# Patient Record
Sex: FEMALE | Race: WHITE
[De-identification: names, ages, dates, MRNs, and addresses within clinical notes are randomized per-mention and may not be internally consistent; named-entity substitution may affect disease eponyms.]

---

## 2019-08-09 ENCOUNTER — HOSPITAL ENCOUNTER (EMERGENCY)
Dept: HOSPITAL 95 - ER | Age: 84
Discharge: OTHER INSTITUTION | End: 2019-08-09
Payer: MEDICARE

## 2019-08-09 VITALS — HEIGHT: 71 IN | WEIGHT: 150 LBS | BODY MASS INDEX: 21 KG/M2

## 2019-08-09 DIAGNOSIS — Z79.899: ICD-10-CM

## 2019-08-09 DIAGNOSIS — N18.6: ICD-10-CM

## 2019-08-09 DIAGNOSIS — I50.32: ICD-10-CM

## 2019-08-09 DIAGNOSIS — Z88.5: ICD-10-CM

## 2019-08-09 DIAGNOSIS — Z88.6: ICD-10-CM

## 2019-08-09 DIAGNOSIS — M96.830: ICD-10-CM

## 2019-08-09 DIAGNOSIS — T82.42XA: Primary | ICD-10-CM

## 2019-08-09 DIAGNOSIS — L76.22: ICD-10-CM

## 2019-08-09 DIAGNOSIS — Z88.8: ICD-10-CM

## 2019-08-09 DIAGNOSIS — E78.5: ICD-10-CM

## 2019-08-09 DIAGNOSIS — Z99.2: ICD-10-CM

## 2019-08-09 DIAGNOSIS — I25.10: ICD-10-CM

## 2019-08-09 DIAGNOSIS — Z79.82: ICD-10-CM

## 2019-08-09 DIAGNOSIS — I12.0: ICD-10-CM

## 2019-08-09 DIAGNOSIS — Z87.891: ICD-10-CM

## 2019-08-09 DIAGNOSIS — F17.210: ICD-10-CM

## 2019-08-09 LAB
ALBUMIN SERPL BCP-MCNC: 3.3 G/DL (ref 3.4–5)
ALBUMIN/GLOB SERPL: 0.7 {RATIO} (ref 0.8–1.8)
ALT SERPL W P-5'-P-CCNC: 14 U/L (ref 12–78)
ANION GAP SERPL CALCULATED.4IONS-SCNC: 9 MMOL/L (ref 6–16)
AST SERPL W P-5'-P-CCNC: 16 U/L (ref 12–37)
BASOPHILS # BLD AUTO: 0.1 K/MM3 (ref 0–0.23)
BASOPHILS NFR BLD AUTO: 1 % (ref 0–2)
BILIRUB SERPL-MCNC: 0.3 MG/DL (ref 0.1–1)
BUN SERPL-MCNC: 37 MG/DL (ref 8–24)
CALCIUM SERPL-MCNC: 8.3 MG/DL (ref 8.5–10.1)
CHLORIDE SERPL-SCNC: 98 MMOL/L (ref 98–108)
CO2 SERPL-SCNC: 33 MMOL/L (ref 21–32)
CREAT SERPL-MCNC: 4.87 MG/DL (ref 0.4–1)
DEPRECATED RDW RBC AUTO: 54.5 FL (ref 35.1–46.3)
EOSINOPHIL # BLD AUTO: 0.46 K/MM3 (ref 0–0.68)
EOSINOPHIL NFR BLD AUTO: 4 % (ref 0–6)
ERYTHROCYTE [DISTWIDTH] IN BLOOD BY AUTOMATED COUNT: 14.2 % (ref 11.7–14.2)
GLOBULIN SER CALC-MCNC: 4.8 G/DL (ref 2.2–4)
GLUCOSE SERPL-MCNC: 93 MG/DL (ref 70–99)
HCT VFR BLD AUTO: 37.8 % (ref 33–51)
HGB BLD-MCNC: 11.9 G/DL (ref 11.5–16)
IMM GRANULOCYTES # BLD AUTO: 0.03 K/MM3 (ref 0–0.1)
IMM GRANULOCYTES NFR BLD AUTO: 0 % (ref 0–1)
LYMPHOCYTES # BLD AUTO: 2.33 K/MM3 (ref 0.84–5.2)
LYMPHOCYTES NFR BLD AUTO: 20 % (ref 21–46)
MCHC RBC AUTO-ENTMCNC: 31.5 G/DL (ref 31.5–36.5)
MCV RBC AUTO: 104 FL (ref 80–100)
MONOCYTES # BLD AUTO: 1.08 K/MM3 (ref 0.16–1.47)
MONOCYTES NFR BLD AUTO: 9 % (ref 4–13)
NEUTROPHILS # BLD AUTO: 7.85 K/MM3 (ref 1.96–9.15)
NEUTROPHILS NFR BLD AUTO: 66 % (ref 41–73)
NRBC # BLD AUTO: 0 K/MM3 (ref 0–0.02)
NRBC BLD AUTO-RTO: 0 /100 WBC (ref 0–0.2)
PLATELET # BLD AUTO: 332 K/MM3 (ref 150–400)
POTASSIUM SERPL-SCNC: 4.3 MMOL/L (ref 3.5–5.5)
PROT SERPL-MCNC: 8.1 G/DL (ref 6.4–8.2)
SODIUM SERPL-SCNC: 140 MMOL/L (ref 136–145)

## 2019-08-09 PROCEDURE — C1750 CATH, HEMODIALYSIS,LONG-TERM: HCPCS

## 2019-08-09 PROCEDURE — A9270 NON-COVERED ITEM OR SERVICE: HCPCS

## 2019-08-09 NOTE — NUR
RIJ DRESSING REMAINED C/D/I WITH PT AMBULATING IN DAY SURGERY.DISCHARGED TO
HOME-OUT VIA WHEELCHAIR WITH RX, DISCHARGE INSTRUCTIONS AND BELONGINGS ON
HAND.

## 2019-08-09 NOTE — NUR
SMALL AMOUNT OF OOZING TO RIJ DRESSING. STERI STRIP APPLIED AND POLYMEM
DRESSING PLACED. DIRECT PRESSURE HELD FOR 5 MIN. DISCHARGE INSTRUCTIONS
REVIEWED WITH PT AND GRANDDAUGHTER.

## 2019-08-09 NOTE — NUR
INTO SDS VIA [employer]. PT A&OX3-DENIES PAIN OR NAUSEA. HISTORY AND ALLERGIES
REVIEWED.LUNGS CLEAR.SATS>90% ON 3 LITERS NASAL CANULA. NPO STATUS CONFIRMED.
RIGHT CHEST WITH GUAZE DRESSING IN PLACE-WILL REMOVE AND CLEANSE GENTLY WITH
CHLORHEXIDINE.

## 2019-08-09 NOTE — NUR
SMALL SCAB NOTED TO RIGHT CHEST WALL WHERE THE PREVIOUS VASC CATH WAS PLACED.
NO SIGNS OF INFECTION.

## 2019-12-17 ENCOUNTER — HOSPITAL ENCOUNTER (INPATIENT)
Dept: HOSPITAL 95 - ER | Age: 84
LOS: 3 days | Discharge: HOME | DRG: 280 | End: 2019-12-20
Attending: HOSPITALIST | Admitting: HOSPITALIST
Payer: MEDICARE

## 2019-12-17 VITALS — BODY MASS INDEX: 27.47 KG/M2 | HEIGHT: 65 IN | WEIGHT: 164.91 LBS

## 2019-12-17 DIAGNOSIS — Z99.2: ICD-10-CM

## 2019-12-17 DIAGNOSIS — I21.A1: ICD-10-CM

## 2019-12-17 DIAGNOSIS — Z51.5: ICD-10-CM

## 2019-12-17 DIAGNOSIS — E87.1: ICD-10-CM

## 2019-12-17 DIAGNOSIS — I50.32: ICD-10-CM

## 2019-12-17 DIAGNOSIS — E87.5: ICD-10-CM

## 2019-12-17 DIAGNOSIS — J96.21: ICD-10-CM

## 2019-12-17 DIAGNOSIS — Z79.82: ICD-10-CM

## 2019-12-17 DIAGNOSIS — N18.6: ICD-10-CM

## 2019-12-17 DIAGNOSIS — I25.2: ICD-10-CM

## 2019-12-17 DIAGNOSIS — Z66: ICD-10-CM

## 2019-12-17 DIAGNOSIS — E87.2: ICD-10-CM

## 2019-12-17 DIAGNOSIS — D89.2: ICD-10-CM

## 2019-12-17 DIAGNOSIS — I13.2: Primary | ICD-10-CM

## 2019-12-17 DIAGNOSIS — I25.10: ICD-10-CM

## 2019-12-17 DIAGNOSIS — J44.9: ICD-10-CM

## 2019-12-17 DIAGNOSIS — Z87.891: ICD-10-CM

## 2019-12-17 DIAGNOSIS — Z99.81: ICD-10-CM

## 2019-12-17 LAB
ALBUMIN SERPL BCP-MCNC: 3.6 G/DL (ref 3.4–5)
ALBUMIN/GLOB SERPL: 0.7 {RATIO} (ref 0.8–1.8)
ALT SERPL W P-5'-P-CCNC: 23 U/L (ref 12–78)
ANION GAP SERPL CALCULATED.4IONS-SCNC: 10 MMOL/L (ref 6–16)
AST SERPL W P-5'-P-CCNC: 21 U/L (ref 12–37)
BASOPHILS # BLD AUTO: 0.1 K/MM3 (ref 0–0.23)
BASOPHILS NFR BLD AUTO: 1 % (ref 0–2)
BILIRUB SERPL-MCNC: 1 MG/DL (ref 0.1–1)
BUN SERPL-MCNC: 22 MG/DL (ref 8–24)
CALCIUM SERPL-MCNC: 9 MG/DL (ref 8.5–10.1)
CHLORIDE SERPL-SCNC: 94 MMOL/L (ref 98–108)
CO2 SERPL-SCNC: 31 MMOL/L (ref 21–32)
CREAT SERPL-MCNC: 3.98 MG/DL (ref 0.4–1)
DEPRECATED RDW RBC AUTO: 53 FL (ref 35.1–46.3)
EOSINOPHIL # BLD AUTO: 0.05 K/MM3 (ref 0–0.68)
EOSINOPHIL NFR BLD AUTO: 0 % (ref 0–6)
ERYTHROCYTE [DISTWIDTH] IN BLOOD BY AUTOMATED COUNT: 13.9 % (ref 11.7–14.2)
GLOBULIN SER CALC-MCNC: 5.3 G/DL (ref 2.2–4)
GLUCOSE SERPL-MCNC: 118 MG/DL (ref 70–99)
HCT VFR BLD AUTO: 36.7 % (ref 33–51)
HGB BLD-MCNC: 12.1 G/DL (ref 11.5–16)
IMM GRANULOCYTES # BLD AUTO: 0.08 K/MM3 (ref 0–0.1)
IMM GRANULOCYTES NFR BLD AUTO: 0 % (ref 0–1)
LYMPHOCYTES # BLD AUTO: 1.96 K/MM3 (ref 0.84–5.2)
LYMPHOCYTES NFR BLD AUTO: 11 % (ref 21–46)
MCHC RBC AUTO-ENTMCNC: 33 G/DL (ref 31.5–36.5)
MCV RBC AUTO: 103 FL (ref 80–100)
MONOCYTES # BLD AUTO: 1.34 K/MM3 (ref 0.16–1.47)
MONOCYTES NFR BLD AUTO: 7 % (ref 4–13)
NEUTROPHILS # BLD AUTO: 14.75 K/MM3 (ref 1.96–9.15)
NEUTROPHILS NFR BLD AUTO: 81 % (ref 41–73)
NRBC # BLD AUTO: 0 K/MM3 (ref 0–0.02)
NRBC BLD AUTO-RTO: 0 /100 WBC (ref 0–0.2)
PH BLDA: 7.64 [PH] (ref 7.35–7.45)
PLATELET # BLD AUTO: 320 K/MM3 (ref 150–400)
POTASSIUM SERPL-SCNC: 4 MMOL/L (ref 3.5–5.5)
PROT SERPL-MCNC: 8.9 G/DL (ref 6.4–8.2)
PROTHROMBIN TIME: 10.7 SEC (ref 9.7–11.5)
SODIUM SERPL-SCNC: 135 MMOL/L (ref 136–145)
TROPONIN I SERPL-MCNC: 0.35 NG/ML (ref 0–0.04)

## 2019-12-17 PROCEDURE — G0378 HOSPITAL OBSERVATION PER HR: HCPCS

## 2019-12-17 NOTE — NUR
ADMISSION: PATIENT IS RECIEVED FROM ER VIA STRETCHER. FAMILY IS AT BEDSIDE,
PATIENT IS TRANSFERED TO BED WITH ASSIST OF 4, LATERAL SLIDE. PATIENT IS SOB
WITH SPEECH. GRAND DAUGHTER ANSWERS ADMISSION QUESTIONS WITH PATIENTS
PERMISSION DUE TO SOB AND EXAUSTION. PATIENT HAS NO COMPLAINTS AT THIS TIME
AND IS RESTING COMPFORTABLY WITH 3L NC 02 IN PLACE. PATIENT HAS BEEN MADE
COMFORT CARE. RAMÍREZ IS NOT PLACED DUE TO OLIGURIA AT BASELINE. FAMILY HAS GONE
HOME, CALL BELL IS WITH IN REACH AND BED ALARM IS ON FOR SAFETY.

## 2019-12-18 LAB
CO2 SERPL-SCNC: 32 MMOL/L (ref 21–32)
POTASSIUM SERPL-SCNC: 4.9 MMOL/L (ref 3.5–5.5)

## 2019-12-18 PROCEDURE — 5A1D70Z PERFORMANCE OF URINARY FILTRATION, INTERMITTENT, LESS THAN 6 HOURS PER DAY: ICD-10-PCS | Performed by: SPECIALIST

## 2019-12-18 NOTE — NUR
Initial palliative care consult:
 
Racheal is an 87 year old with a history of CAD, multiple stent placements,
HTN, hyperlipidemia, ESRD on HD (Tuesday, Thursday, Saturday), chronic
diastolic heart failure and home O2 at 3l/min.  She was admitted last night
with a NSTEMI.
 
Racheal lives at [facility]. She ambulates with a walker.  She has a
caregiver who takes her to and from HD three times a week.  She has been
receiving HD three times a week since this past May.  She reports that she
feels terrible on HD days after her treatment, however she states usually the
day after her treatment is better, but not always.  She remembers waking up in
the ER last night to a lot of people talking to her. She remembers having
something happen with her heart and she also recalls that "It was the scariest
thing I've ever been through."
 
She is unsure if she would like to remain on comfort measures only or consider
changing her POC and starting some non-aggressive, non-invasive treatments.
Discussed comfort care philosophy and gave her options of continued comfort
care with hospice arrangements as appropriate, treatment with limited
interventions with or without code status change, or full treatment and full
code.  Explained each of those options.  She definitely agreed that full
treatment and full code are not her wishes.  She wants to consider her
options, talk with her family and Lil at [facility] who is close to
her before making a decision.  Her son is at the bedside during this
conversation, her dtr is not present, however she lives locally.  Sons
questions answered. Encouraged her to take her time and decide what is most
important to her and to think about her QOL.  PC will remain available to
answer further questions as needed.  She has no complaints or symptoms at this
time except some itching around her HD catheter which she attributes to the
catheter dressing.  Tran, , and Pham her nurse updated on
current POC.  PC to follow up with POC and symptom managment.

## 2019-12-18 NOTE — NUR
SHIFT SUMMARY
THE PATIENT HAS HAD A GOOD DAY; SHE WOKE UP FEELING PRETTY GOOD AND DECIDED
THAT SHE DID NOT WANT TO BE OF 'COMFORT CARE' FEELING AS WELL AS SHE DID, SO
THAT WAS CHANGED AND PATIENT IS NO LONGER ON COMFORT CARE AND JUST OF DNR
STATUS. THE PATIENT IS ALERT AND ORIENTED, ABLE TO MAKE NEEDS KNOWN. [place] [facility] [place]. VERY PLEASANT AND COOPERATIVE WITH STAFF. HER
DAUGHTER HAD RECENTLY COME TO ASK ME ABOUT THE PATIENT RECEIVING DIALYSIS, AS
SHE IS SUPPOSED TO RECIEVE IT TOMMORROW. DR JAIME WAS CALLED AT 1730
AND RELAYED THE MESSAGE AND DID GIVE ORDERS FOR A NEPHROLOGY CONSULT; PATIENT
WAS ADDED TO DR THOMPSON'S PATIENT LIST. ASIDE FROM THAT THE PATIENT HAS DENIED
PAIN OR DISCOMFORT AND HAS HAD NO OTHER CHANGES TO REPORT ON AT THIS TIME.
WILL CONTINUE TO MONITOR AND PROVIDE CARE AS NEEDED.

## 2019-12-18 NOTE — NUR
GI: PATIENT IS REPORTING STOMACH BURNING AND THAT SHE TAKES PRILOSEC DAILY.
JONATHAN MACKENZIE NP IS NOTIFIED AND ORDER FOR PRILOSEC 20 MG DAILY WITH FIRST
DOSE NOW IS OBTAINED.

## 2019-12-18 NOTE — NUR
ORDER CLARIFICATION: COMFORT CARE HAS BEEN ORDERED. ORDER IS VERIFIED WITH
JONATHAN MACKENZIE NP. ALL INVASIVE TREATMENT AND PROCEEDURES ARE DISCONTINUED.
COMFORT CARE ONLY.

## 2019-12-18 NOTE — NUR
COMFORT: PATIENT IS RESTLESS, KEEPS MOVING LEGS AND TURNING SIDE TO SIDE IN
BED. NO COMPLAINTS OF PAIN AT THIS TIME. ATIVAN 1MG IS GIVEN.

## 2019-12-18 NOTE — NUR
THERE IS A NOTABLY LARGE DIFFERENCE IN SBP BETWEEN TERRY AND CAN BY 75 POINTS.
PATIENTS DAUGHTER AND THE PATIENT STATE IT HAS BEEN THIS WAS FOR YEARS.

## 2019-12-18 NOTE — NUR
COMFORT: PATIENT IS RESTING COMFORTABLE, CONTINUES TO HAVE GOOD EFFECT FROM
LORAZAPAM AND IS SLEEPING. RESPIRATIONS ARE 20.

## 2019-12-18 NOTE — NUR
COMFORT: PATIENT HAS MASHA EFFECT FROM ATIVAN AND IS SLEEPING COMFORTABLY.
RESPIRATIONS ARE 18 AND EASY. BED ALARM IS ON FOR SAFETY.

## 2019-12-18 NOTE — NUR
PATIENT MAKES MENTION OF NOT WANTING TO BE OF COMFORT CARE STATUS. I CALLED
PALLIATIVE CARE AND LEFT A MESSAGE REGARDING THIS AT 0840.

## 2019-12-18 NOTE — NUR
PATIENT HAS BEEN REPOSITIONING HERSELF AS NEEDED IN BED. PATIENT IS STILL
CAPAPBLE TO REPOSITION SELF AND ASKS FOR ADDITIONAL ASSISTANCE FROM STAFF AS
NEEDED.

## 2019-12-19 LAB
ALBUMIN SERPL BCP-MCNC: 3.1 G/DL (ref 3.4–5)
ANION GAP SERPL CALCULATED.4IONS-SCNC: 12 MMOL/L (ref 6–16)
BASOPHILS # BLD AUTO: 0.05 K/MM3 (ref 0–0.23)
BASOPHILS NFR BLD AUTO: 0 % (ref 0–2)
BUN SERPL-MCNC: 59 MG/DL (ref 8–24)
CALCIUM SERPL-MCNC: 8.2 MG/DL (ref 8.5–10.1)
CHLORIDE SERPL-SCNC: 93 MMOL/L (ref 98–108)
CO2 SERPL-SCNC: 29 MMOL/L (ref 21–32)
CREAT SERPL-MCNC: 7.72 MG/DL (ref 0.4–1)
DEPRECATED RDW RBC AUTO: 54.8 FL (ref 35.1–46.3)
EOSINOPHIL # BLD AUTO: 0.4 K/MM3 (ref 0–0.68)
EOSINOPHIL NFR BLD AUTO: 3 % (ref 0–6)
ERYTHROCYTE [DISTWIDTH] IN BLOOD BY AUTOMATED COUNT: 13.9 % (ref 11.7–14.2)
GLUCOSE SERPL-MCNC: 93 MG/DL (ref 70–99)
HCT VFR BLD AUTO: 31 % (ref 33–51)
HGB BLD-MCNC: 9.8 G/DL (ref 11.5–16)
IMM GRANULOCYTES # BLD AUTO: 0.06 K/MM3 (ref 0–0.1)
IMM GRANULOCYTES NFR BLD AUTO: 1 % (ref 0–1)
LYMPHOCYTES # BLD AUTO: 2.16 K/MM3 (ref 0.84–5.2)
LYMPHOCYTES NFR BLD AUTO: 18 % (ref 21–46)
MAGNESIUM SERPL-MCNC: 2.9 MG/DL (ref 1.6–2.4)
MCHC RBC AUTO-ENTMCNC: 31.6 G/DL (ref 31.5–36.5)
MCV RBC AUTO: 106 FL (ref 80–100)
MONOCYTES # BLD AUTO: 1.26 K/MM3 (ref 0.16–1.47)
MONOCYTES NFR BLD AUTO: 10 % (ref 4–13)
NEUTROPHILS # BLD AUTO: 8.36 K/MM3 (ref 1.96–9.15)
NEUTROPHILS NFR BLD AUTO: 68 % (ref 41–73)
NRBC # BLD AUTO: 0 K/MM3 (ref 0–0.02)
NRBC BLD AUTO-RTO: 0 /100 WBC (ref 0–0.2)
PHOSPHATE SERPL-MCNC: 7.7 MG/DL (ref 2.5–4.9)
PLATELET # BLD AUTO: 272 K/MM3 (ref 150–400)
POTASSIUM SERPL-SCNC: 4.7 MMOL/L (ref 3.5–5.5)
SODIUM SERPL-SCNC: 134 MMOL/L (ref 136–145)

## 2019-12-19 NOTE — NUR
PATIENT JUST RETURNED FROM DIALYSIS AND STATES SHE IS FEELING HORRIBLE AND
MADE THE COMMENT THAT "I CHANGE MY MIND, I CAN'T KEEP DOING THIS."
PATIENT STATES SHE DOES NOT FEEL LIKE SHE CAN GO HOME AT THIS TIME. ATTEMPTED
TO CALL DR JAIME,  PICKED UP. GRANDDAUGHTER IS HERE AND AWARE OF THE
SITUATION. WILL NOTIFY PALLIATIVE CARE TO TALK WITH THE PATIENT AND
GRANDAUGHTER AGAIN.

## 2019-12-19 NOTE — NUR
Inital spiritual care note:
 
Mrs. Boggs was warm and welcoming of conversation.  she has rescently made
the decision to forgo further dialysis.  She says she is at peace with this
decision and began to tell me about her life.  She is an amazing
woman--articulate, charming, and self-aware.  She feels well loved and
supported by family.  Denies regrets.  Mrs. Boggs responded well to gentle
 and theraputic listeing.  She is not Restorationism, but appeared to
appreciate encouragement.   Services will remain available.

## 2019-12-19 NOTE — NUR
SHIFT ASSESSMENT: PATIENT IS A&OX4, VS ARE STABLE, NO COMPLAINTS OF CHEST PAIN
OR ANY DISCOMFORT. PATIENT HAD GOOD EFFECT FROM PRILOSEC AND REPORTS SLEEPING
WELL THIS SHIFT.

## 2019-12-19 NOTE — NUR
SHIFT SUMMARY
THE PATIENT HAS HAD AN EMOTIONALLY ROUGH SHIFT. AFTER RETURNING FROM DIALYSIS
SHE FELT PHYSICALLY EXHAUSTED AND EXPLAINED THAT SHE COULD NO LONGER HANDLE
GOING THROUGH DIALYSIS. AFTER THE PATIENT HAD SOME DISCUSION WITH DR JAIME
AND KYLE IN PALLATIVE CARE, IT WAS DECIDED THAT THE PATIENT'S CURRENTLY
STATUS AT THE HOSPITAL WILL REMAIN THE SAME AND THE PLAN IS TO DISCHARGE HOME
TOMORROW ON HOSPICE. THE PATIENT TOOK A DOSE OF NORVASC THIS AFTERNOON D/T
HIGH BP AND IT EFFECTIVELY BROUGHT HER BP DOWN TO 's. THE PATIENT
APPEARS MORE COMFORTABLE HAVING MADE THE DECISION TO STOP DIALYSIS. SHE HAS
BEEN RESTING IN HER ROOM WITH VISITORS AT HER BEDSIDE.

## 2019-12-19 NOTE — NUR
Pt resting in bed upon arrival. Granddaughter Montse (POA) at bedside. Pt
reports she no longer wants to do dialysis and wants hospice. Discharge
Planner Ritu is aware and plan. Plan for Pt to discharge with [facility]. Pt requests for her Perma Cath to be removed. No other concerns
reported at this time.
 
Spoke with Dr Reyes and bedside RN Pham. Pham will place surgical consult
for Perma Cath to be removed.
 
No other concerns reported at this time.
 
Palliative Care will remain available.

## 2019-12-20 NOTE — NUR
discharge summary
 
patient discharged on hospice. removed patient's iv prior to her discharge.
all discharge papers were spoken over with the patient prior to her discahrge.
she was aware that she wasleaving on hospice. she was picked up by a
wheelchair taxi as prior arrangements had been made yesterday. no acute
concerns at the time of discharge. [facility] hospice intake nurse saw the
patient prior to discharge and arranged to meet with the patient at 1100 at
her home at [facility]. patient was taken by the wheelchair taxi at
1006. all paperwork was signed, no concerns.

## 2019-12-20 NOTE — NUR
SHIFT SUMMARY: PATIENT REPORTS FEELING WORN OUT AFTER DIALYSIS. PATIENT
REPORTS SHE DOES NOT WISH TO CONTINUE WITH DIALYSIS BECAUSE SHE CAN NOT
TOLERAT ALL THE SIDE EFFECTS. " I NEVER WOULD HAVE DONE IT IF I HAD KNOW HOW
BAD IT WOULD BE". PATIENT REFUSES LABS THIS AM. VS ARE STABLE, NO COMPLIANTS
OF PAIN. WILL CONTINUE TO MONITOR.